# Patient Record
Sex: FEMALE | Race: WHITE | NOT HISPANIC OR LATINO | Employment: FULL TIME | URBAN - METROPOLITAN AREA
[De-identification: names, ages, dates, MRNs, and addresses within clinical notes are randomized per-mention and may not be internally consistent; named-entity substitution may affect disease eponyms.]

---

## 2018-01-11 ENCOUNTER — HOSPITAL ENCOUNTER (EMERGENCY)
Facility: HOSPITAL | Age: 58
Discharge: HOME | End: 2018-01-11
Attending: EMERGENCY MEDICINE | Admitting: EMERGENCY MEDICINE

## 2018-01-11 VITALS
HEART RATE: 68 BPM | TEMPERATURE: 98.1 F | WEIGHT: 174 LBS | RESPIRATION RATE: 20 BRPM | HEIGHT: 65 IN | BODY MASS INDEX: 28.99 KG/M2 | DIASTOLIC BLOOD PRESSURE: 85 MMHG | SYSTOLIC BLOOD PRESSURE: 140 MMHG | OXYGEN SATURATION: 98 %

## 2018-01-11 DIAGNOSIS — N76.4 ABSCESS OF RIGHT GENITAL LABIA: Primary | ICD-10-CM

## 2018-01-11 PROCEDURE — 99282 EMERGENCY DEPT VISIT SF MDM: CPT

## 2018-01-11 RX ORDER — AMOXICILLIN AND CLAVULANATE POTASSIUM 875; 125 MG/1; MG/1
1 TABLET, FILM COATED ORAL ONCE
Status: COMPLETED | OUTPATIENT
Start: 2018-01-11 | End: 2018-01-11

## 2018-01-11 RX ORDER — AMOXICILLIN AND CLAVULANATE POTASSIUM 875; 125 MG/1; MG/1
1 TABLET, FILM COATED ORAL 2 TIMES DAILY
Qty: 14 TABLET | Refills: 0 | Status: SHIPPED | OUTPATIENT
Start: 2018-01-11 | End: 2018-01-18

## 2018-01-11 RX ORDER — IBUPROFEN 600 MG/1
600 TABLET ORAL ONCE
Status: COMPLETED | OUTPATIENT
Start: 2018-01-11 | End: 2018-01-11

## 2018-01-11 RX ADMIN — IBUPROFEN 600 MG: 600 TABLET ORAL at 23:39

## 2018-01-11 RX ADMIN — AMOXICILLIN AND CLAVULANATE POTASSIUM 1 TABLET: 875; 125 TABLET, FILM COATED ORAL at 23:39

## 2018-01-12 NOTE — ED PROVIDER NOTES
History  Chief Complaint   Patient presents with    Abscess     Pt  presents to the ED with complaints of an abscess on her right labia rosalee has been present for 2 days  Pt  has attempted warm compresses but with no relief  49-year-old female presents emergency room for evaluation of right labial abscess  Onset 2 days ago  Today it will increased and she noted some drainage  She has been applying warm compresses with minimal relief  Pain is worse with walking  Denies fever nausea or vomiting  Denies history of this in the past         History provided by:  Patient  Abscess   Associated symptoms: no fever, no nausea and no vomiting        Prior to Admission Medications   Prescriptions Last Dose Informant Patient Reported? Taking? DULoxetine (CYMBALTA) 60 mg delayed release capsule 1/11/2018 at Unknown time  Yes Yes   Sig: Take 60 mg by mouth daily   Multiple Vitamins-Minerals (MULTIVITAMIN WITH MINERALS) tablet 1/11/2018 at Unknown time  Yes Yes   Sig: Take 1 tablet by mouth daily      Facility-Administered Medications: None       Past Medical History:   Diagnosis Date    Psychiatric disorder     depression       Past Surgical History:   Procedure Laterality Date    GASTRIC BYPASS      2004    HYSTERECTOMY         History reviewed  No pertinent family history  I have reviewed and agree with the history as documented  Social History   Substance Use Topics    Smoking status: Never Smoker    Smokeless tobacco: Never Used    Alcohol use No        Review of Systems   Constitutional: Negative for chills and fever  Gastrointestinal: Negative for abdominal pain, constipation, diarrhea, nausea and vomiting  Genitourinary: Negative for dysuria, vaginal bleeding and vaginal discharge  Skin: Positive for wound         Physical Exam  ED Triage Vitals [01/11/18 2229]   Temperature Pulse Respirations Blood Pressure SpO2   98 1 °F (36 7 °C) 68 20 140/85 98 %      Temp Source Heart Rate Source Patient Position - Orthostatic VS BP Location FiO2 (%)   Oral Monitor Sitting Left arm --      Pain Score       6           Orthostatic Vital Signs  Vitals:    01/11/18 2229   BP: 140/85   Pulse: 68   Patient Position - Orthostatic VS: Sitting       Physical Exam   Constitutional: She appears well-developed and well-nourished  Cardiovascular: Normal rate, regular rhythm and normal heart sounds  Genitourinary:   Genitourinary Comments: Right labia with local erythema and induration  There is a 0 75 cm open area with purulent phlegmon  during physical exam this area was probed with forceps and iris scissors  about 1 cm in depth no fluid collection underlying, there is no fluctuance upon palpation, it is very hard  Skin: Skin is warm and dry  Psychiatric: She has a normal mood and affect  Nursing note and vitals reviewed  ED Medications  Medications   amoxicillin-clavulanate (AUGMENTIN) 875-125 mg per tablet 1 tablet (not administered)   ibuprofen (MOTRIN) tablet 600 mg (not administered)       Diagnostic Studies  Results Reviewed     None                 No orders to display              Procedures  Procedures       Phone Contacts  ED Phone Contact    ED Course  ED Course                                MDM  Number of Diagnoses or Management Options  Abscess of right genital labia:   Risk of Complications, Morbidity, and/or Mortality  General comments: Had shared decision making conversation regarding conservative management with warm soaks and antibiotics versus opening abscess further  Patient opts for conservative management  I advised her to keep a close eye on it and return to emergency room if worse would like to have wound recheck in 2 days she understands and agrees to do so  Advised to wear loose clothing      Patient Progress  Patient progress: stable    CritCare Time    Disposition  Final diagnoses:   Abscess of right genital labia     Time reflects when diagnosis was documented in both MDM as applicable and the Disposition within this note     Time User Action Codes Description Comment    1/11/2018 11:34 PM Marti LOJA Add [N76 4] Abscess of right genital labia       ED Disposition     ED Disposition Condition Comment    Discharge  Mi Maldonado discharge to home/self care  Condition at discharge: Good        Follow-up Information     Follow up With Specialties Details Why Contact Info Additional Yuval Wolff MD Obstetrics and Gynecology In 3 days  [address] [phone]       [place] [address] Emergency Department Emergency Medicine In 2 days For wound re-check or sooner if worse [address] [address]  [phone] [facility] ED,  [address], [place], [address]        Patient's Medications   Discharge Prescriptions    AMOXICILLIN-CLAVULANATE (AUGMENTIN) 875-125 MG PER TABLET    Take 1 tablet by mouth 2 (two) times a day for 7 days       Start Date: 1/11/2018 End Date: 1/18/2018       Order Dose: 1 tablet       Quantity: 14 tablet    Refills: 0     No discharge procedures on file      ED Provider  Electronically Signed by           Kenneth Steve PA-C  01/11/18 0610

## 2018-01-12 NOTE — DISCHARGE INSTRUCTIONS
Abscess   WHAT YOU NEED TO KNOW:   A warm compress may help your abscess drain  Your healthcare provider may make a cut in the abscess so it can drain  You may need surgery to remove an abscess that is on your hands or buttocks  DISCHARGE INSTRUCTIONS:   Return to the emergency department if:   · The area around your abscess becomes very painful, warm, or has red streaks  · You have a fever and chills  · Your heart is beating faster than usual      · You feel faint or confused  Contact your healthcare provider if:   · Your abscess gets bigger or does not get better  · Your abscess returns  · You have questions or concerns about your condition or care  Medicines: You may  need any of the following:  · Antibiotics  help treat a bacterial infection  · Acetaminophen  decreases pain and fever  It is available without a doctor's order  Ask how much to take and how often to take it  Follow directions  Acetaminophen can cause liver damage if not taken correctly  · NSAIDs , such as ibuprofen, help decrease swelling, pain, and fever  This medicine is available with or without a doctor's order  NSAIDs can cause stomach bleeding or kidney problems in certain people  If you take blood thinner medicine, always ask your healthcare provider if NSAIDs are safe for you  Always read the medicine label and follow directions  · Take your medicine as directed  Contact your healthcare provider if you think your medicine is not helping or if you have side effects  Tell him or her if you are allergic to any medicine  Keep a list of the medicines, vitamins, and herbs you take  Include the amounts, and when and why you take them  Bring the list or the pill bottles to follow-up visits  Carry your medicine list with you in case of an emergency  Self-care:   · Apply a warm compress to your abscess  This will help it open and drain  Wet a washcloth in warm, but not hot, water  Apply the compress for 10 minutes  Repeat this 4 times each day  Do not  press on an abscess or try to open it with a needle  You may push the bacteria deeper or into your blood  · Do not share your clothes, towels, or sheets with anyone  This can spread the infection to others  · Wash your hands often  This can help prevent the spread of germs  Use soap and water or an alcohol-based hand rub  Care for your wound after it is drained:   · Care for your wound as directed  If your healthcare provider says it is okay, carefully remove the bandage and gauze packing  You may need to soak the gauze to get it out of your wound  Clean your wound and the area around it as directed  Dry the area and put on new, clean bandages  Change your bandages when they get wet or dirty  · Ask your healthcare provider how to change the gauze in your wound  Keep track of how many pieces of gauze are placed inside the wound  Do not put too much packing in the wound  Do not pack the gauze too tightly in your wound  Follow up with your healthcare provider in 1 to 3 days: You may need to have your packing removed or your bandage changed  Write down your questions so you remember to ask them during your visits  © 2017 [address]  Information is for End User's use only and may not be sold, redistributed or otherwise used for commercial purposes  All illustrations and images included in CareNotes® are the copyrighted property of [employer] , [employer]  or [employer]  The above information is an  only  It is not intended as medical advice for individual conditions or treatments  Talk to your doctor, nurse or pharmacist before following any medical regimen to see if it is safe and effective for you

## 2018-01-12 NOTE — ED NOTES
D/c meds/insturctions discussed with patient prior to d/c  Encouraged to f/u with pcp/ ob/gyn        Neil Gee RN  01/11/18 9823

## 2021-03-31 DIAGNOSIS — Z23 ENCOUNTER FOR IMMUNIZATION: ICD-10-CM

## 2023-04-23 ENCOUNTER — APPOINTMENT (EMERGENCY)
Dept: RADIOLOGY | Facility: HOSPITAL | Age: 63
End: 2023-04-23

## 2023-04-23 ENCOUNTER — HOSPITAL ENCOUNTER (INPATIENT)
Facility: HOSPITAL | Age: 63
LOS: 1 days | Discharge: HOME | End: 2023-04-23
Attending: SURGERY | Admitting: SURGERY

## 2023-04-23 ENCOUNTER — APPOINTMENT (EMERGENCY)
Dept: CT IMAGING | Facility: HOSPITAL | Age: 63
End: 2023-04-23

## 2023-04-23 VITALS
WEIGHT: 185.85 LBS | HEART RATE: 75 BPM | RESPIRATION RATE: 16 BRPM | HEIGHT: 65 IN | OXYGEN SATURATION: 99 % | TEMPERATURE: 98 F | SYSTOLIC BLOOD PRESSURE: 115 MMHG | BODY MASS INDEX: 30.96 KG/M2 | DIASTOLIC BLOOD PRESSURE: 73 MMHG

## 2023-04-23 DIAGNOSIS — S01.01XA SCALP LACERATION, INITIAL ENCOUNTER: ICD-10-CM
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
## 2023-04-23 DIAGNOSIS — Z98.890 HISTORY OF ANKLE SURGERY: ICD-10-CM

## 2023-04-23 PROBLEM — F10.11 HISTORY OF ALCOHOL ABUSE: Status: ACTIVE | Noted: 2023-04-23

## 2023-04-23 LAB
ABO GROUP BLD: NORMAL
ABO GROUP BLD: NORMAL
ANION GAP SERPL CALCULATED.3IONS-SCNC: 8 MMOL/L (ref 4–13)
BASE EXCESS BLDA CALC-SCNC: 0 MMOL/L (ref -2–3)
BASOPHILS # BLD AUTO: 0.05 THOUSANDS/ΜL (ref 0–0.1)
BASOPHILS NFR BLD AUTO: 1 % (ref 0–1)
BLD GP AB SCN SERPL QL: NEGATIVE
BUN SERPL-MCNC: 14 MG/DL (ref 5–25)
CA-I BLD-SCNC: 1.24 MMOL/L (ref 1.12–1.32)
CALCIUM SERPL-MCNC: 8.2 MG/DL (ref 8.4–10.2)
CHLORIDE SERPL-SCNC: 107 MMOL/L (ref 96–108)
CO2 SERPL-SCNC: 23 MMOL/L (ref 21–32)
CREAT SERPL-MCNC: 0.59 MG/DL (ref 0.6–1.3)
EOSINOPHIL # BLD AUTO: 0.38 THOUSAND/ΜL (ref 0–0.61)
EOSINOPHIL NFR BLD AUTO: 5 % (ref 0–6)
ERYTHROCYTE [DISTWIDTH] IN BLOOD BY AUTOMATED COUNT: 12.4 % (ref 11.6–15.1)
GFR SERPL CREATININE-BSD FRML MDRD: 98 ML/MIN/1.73SQ M
GLUCOSE SERPL-MCNC: 121 MG/DL (ref 65–140)
GLUCOSE SERPL-MCNC: 157 MG/DL (ref 65–140)
HCO3 BLDA-SCNC: 26.4 MMOL/L (ref 24–30)
HCT VFR BLD AUTO: 40.7 % (ref 34.8–46.1)
HCT VFR BLD CALC: 39 % (ref 34.8–46.1)
HGB BLD-MCNC: 13.1 G/DL (ref 11.5–15.4)
HGB BLDA-MCNC: 13.3 G/DL (ref 11.5–15.4)
IMM GRANULOCYTES # BLD AUTO: 0.03 THOUSAND/UL (ref 0–0.2)
IMM GRANULOCYTES NFR BLD AUTO: 0 % (ref 0–2)
LYMPHOCYTES # BLD AUTO: 1.74 THOUSANDS/ΜL (ref 0.6–4.47)
LYMPHOCYTES NFR BLD AUTO: 22 % (ref 14–44)
MCH RBC QN AUTO: 30.7 PG (ref 26.8–34.3)
MCHC RBC AUTO-ENTMCNC: 32.2 G/DL (ref 31.4–37.4)
MCV RBC AUTO: 95 FL (ref 82–98)
MONOCYTES # BLD AUTO: 0.56 THOUSAND/ΜL (ref 0.17–1.22)
MONOCYTES NFR BLD AUTO: 7 % (ref 4–12)
NEUTROPHILS # BLD AUTO: 5.26 THOUSANDS/ΜL (ref 1.85–7.62)
NEUTS SEG NFR BLD AUTO: 65 % (ref 43–75)
NRBC BLD AUTO-RTO: 0 /100 WBCS
PCO2 BLD: 28 MMOL/L (ref 21–32)
PCO2 BLD: 47.1 MM HG (ref 42–50)
PH BLD: 7.36 [PH] (ref 7.3–7.4)
PLATELET # BLD AUTO: 256 THOUSANDS/UL (ref 149–390)
PMV BLD AUTO: 10.3 FL (ref 8.9–12.7)
PO2 BLD: 27 MM HG (ref 35–45)
POTASSIUM BLD-SCNC: 3.8 MMOL/L (ref 3.5–5.3)
POTASSIUM SERPL-SCNC: 3.9 MMOL/L (ref 3.5–5.3)
RBC # BLD AUTO: 4.27 MILLION/UL (ref 3.81–5.12)
RH BLD: POSITIVE
RH BLD: POSITIVE
SAO2 % BLD FROM PO2: 47 % (ref 60–85)
SODIUM BLD-SCNC: 140 MMOL/L (ref 136–145)
SODIUM SERPL-SCNC: 138 MMOL/L (ref 135–147)
SPECIMEN EXPIRATION DATE: NORMAL
SPECIMEN SOURCE: ABNORMAL
WBC # BLD AUTO: 8.02 THOUSAND/UL (ref 4.31–10.16)

## 2023-04-23 PROCEDURE — 0HQ0XZZ REPAIR SCALP SKIN, EXTERNAL APPROACH: ICD-10-PCS | Performed by: SURGERY

## 2023-04-23 RX ORDER — ACETAMINOPHEN 325 MG/1
975 TABLET ORAL EVERY 8 HOURS SCHEDULED
Status: DISCONTINUED | OUTPATIENT
Start: 2023-04-23 | End: 2023-04-23 | Stop reason: HOSPADM

## 2023-04-23 RX ORDER — ONDANSETRON 2 MG/ML
4 INJECTION INTRAMUSCULAR; INTRAVENOUS EVERY 4 HOURS PRN
Status: DISCONTINUED | OUTPATIENT
Start: 2023-04-23 | End: 2023-04-23 | Stop reason: HOSPADM

## 2023-04-23 RX ORDER — METHOCARBAMOL 750 MG/1
750 TABLET, FILM COATED ORAL EVERY 6 HOURS SCHEDULED
Status: DISCONTINUED | OUTPATIENT
Start: 2023-04-23 | End: 2023-04-23

## 2023-04-23 RX ORDER — ACETAMINOPHEN 325 MG/1
975 TABLET ORAL EVERY 8 HOURS SCHEDULED
Qty: 30 TABLET | Refills: 0 | Status: SHIPPED | OUTPATIENT
Start: 2023-04-23

## 2023-04-23 RX ORDER — DESMOPRESSIN ACETATE 4 UG/ML
2 INJECTION, SOLUTION INTRAVENOUS; SUBCUTANEOUS ONCE
Status: DISCONTINUED | OUTPATIENT
Start: 2023-04-23 | End: 2023-04-23

## 2023-04-23 RX ORDER — DISULFIRAM 250 MG/1
250 TABLET ORAL DAILY
COMMUNITY

## 2023-04-23 RX ORDER — DULOXETIN HYDROCHLORIDE 60 MG/1
60 CAPSULE, DELAYED RELEASE ORAL DAILY
Status: DISCONTINUED | OUTPATIENT
Start: 2023-04-23 | End: 2023-04-23 | Stop reason: HOSPADM

## 2023-04-23 RX ORDER — DISULFIRAM 250 MG/1
250 TABLET ORAL DAILY
Status: DISCONTINUED | OUTPATIENT
Start: 2023-04-23 | End: 2023-04-23 | Stop reason: HOSPADM

## 2023-04-23 RX ADMIN — DULOXETINE HYDROCHLORIDE 60 MG: 60 CAPSULE, DELAYED RELEASE ORAL at 08:22

## 2023-04-23 RX ADMIN — ACETAMINOPHEN 975 MG: 325 TABLET ORAL at 05:08

## 2023-04-23 RX ADMIN — DESMOPRESSIN ACETATE 35.2 MCG: 4 INJECTION, SOLUTION INTRAVENOUS; SUBCUTANEOUS at 03:39

## 2023-04-23 NOTE — PHYSICAL THERAPY NOTE
"PHYSICAL THERAPY EVALUATION  NAME:  King Nelson  DATE: 04/23/23    AGE:   58 y o  Mrn:   [id number]  Principal problem: Principal Problem:    Scalp laceration, initial encounter  Active Problems:    Fall    History of ankle surgery    History of alcohol abuse      Vitals:    04/23/23 0315 04/23/23 0421 04/23/23 0800 04/23/23 0813   BP: 133/88 125/80  115/73   Pulse: 75 87  75   Resp: 18 18  16   Temp:  98 2 °F (36 8 °C)  98 °F (36 7 °C)   TempSrc:  Oral     SpO2: 97% 97% 99% 99%   Weight:  84 3 kg (185 lb 13 6 oz)     Height:  5' 5\" (1 651 m)         Length Of Stay: 0  Performed at least 2 patient identifiers during session: Name and Birthday  PHYSICAL THERAPY EVALUATION :    04/23/23 1132   PT Last Visit   PT Visit Date 04/23/23   Note Type   Note type Evaluation   Pain Assessment   Pain Assessment Tool 0-10   Pain Score No Pain  (@ rest, none offered during activity)   Restrictions/Precautions   RLE Weight Bearing Per Order (S)  NWB  (per recent admission @ [facility] after procedure)   Braces or Orthoses   (\"posterior splint\" although there is some noticable splint areas near medial>lateral side of splint that wrap to the front of anterior LE)   Other Precautions Bed Alarm; Chair Alarm;Cognitive; Fall Risk;Pain;WBS   Home Living   Type of Home Apartment   Home Layout Bed/bath upstairs; Performs ADLs on one level;Stairs to enter with rails  (1 BRITNEY from outside, then inside walkway, then flight of stes to her second floor set up)   [address] [place] [address],[address]; Wheelchair-manual   Additional Comments Since surgery 4/14/2023: pt has been using rolling walker vs WC in her second floor environment depending on space (WC will not fit in all areas)  She states performing flight of stairs on buttocks, and performing one BRITNEY using walker vs crutches forward with family presence  Pt states not feeling comfortable with crutches on stairs   Although knee scooter script was ordered after her surgery, pt/family states it was not " "\"cleared yet\" through [employer] comp   Prior Function   Level of [place] Independent with functional mobility; Independent with ADLs  (prior to fall in April)   Lives With Alone  (on her level but her sister lives on first floor apartment)   Receives Help From Family;Friend(s); Neighbor   IADLs Independent with meal prep; Independent with driving; Independent with medication management  (prior to fall)   Falls in the last 6 months 1 to 4  (one fall @ work, second fall prior to this admission when coming down stairs to [employer] the dogs out\")   Vocational Workers comp  (currently  Pt was  in [place] prior to fall )   General   Additional Pertinent History Pt recently had fall 4/7/2023, then ORIF R lat malleoli w/ syndesmosis repair on 4/14/2023 @ [facility]   Family/Caregiver Present Yes  (son and DIL)   Cognition   Overall Cognitive Status WFL   Arousal/Participation Alert   Orientation Level Oriented X4   Following Commands Follows one step commands with increased time or repetition  (needs intermittent redirection @ times, but easily redirectable )   Subjective   Subjective Pt pleasant and cooperative, stated that \"the staff was supporised with how I did walking\"  Denies any lightheadedness or spinning, including during change of positions   RUE Assessment   RUE Assessment WFL   LUE Assessment   LUE Assessment WFL   Strength RLE   R Hip Flexion   (tested to 3 w/ SLR)   R Knee Extension   (tested to 3 w/ SLR; exposed toes warm to touch, flex and extend)   Strength LLE   L Hip Flexion 4+/5   L Knee Extension 4+/5   L Ankle Dorsiflexion 4+/5   Coordination   Movements are Fluid and Coordinated 1   Light Touch   RLE Light Touch Grossly intact  (@ exposed toes)   LLE Light Touch Grossly intact   Bed Mobility   Supine to Sit 6  Modified independent   Additional items Assist x 1;HOB elevated; Bedrails; Increased time required;Verbal cues   Sit to Supine Unable to assess  (Pt OOB upon end of eval/treatment)   Transfers " "  Sit to Stand 5  Supervision   Additional items Assist x 1; Increased time required;Verbal cues  (Pt pulling up on walker)   Stand to Sit 5  Supervision   Additional items Increased time required;Verbal cues  (Pt does not reach back for traget surface)   Ambulation/Elevation   Gait pattern Excessively slow; Step to  (maintains NWB throughout gait cycle, but hopping upward as opposed to \"swing through\")   Gait Assistance 5  Supervision   Additional items Assist x 1;Verbal cues   Assistive Device Rolling walker   Distance 20'   Stair Management Assistance Not tested   Balance   Static Sitting Good   Static Standing Fair   Ambulatory Fair   Endurance Deficit   Endurance Deficit Yes   Endurance Deficit Description self reported  fatigue after \"hopping\"   Activity Tolerance   Activity Tolerance Patient limited by fatigue   Medical Staff Made Aware care coordination w/ Duy Brewer from trauma before and at end of session; spoke to Debbie Larkin from case management before session  Assessment:   Pt is a 58 y o  female seen for PT evaluation s/p admit to [facility] w/fall, Scalp laceration, initial encounter, recent ankle surgery approximately 1 week ago     Order placed for PT, however patient was nonweightbearing prior to this admission based on orthopedic orders from 4/14/2023  Della Cassidy Prior to fall on 4/7/2023: Patient was independent without DME, lived in a second-floor apartment with a flight of stairs to enter, and 1 stair from outside  Patient was working as a   Prior to this admission: Pt was most recently alternating between using a rolling walker and wheelchair under second floor set up, was going up and down a flight of stairs on her buttocks, and performed one-step to enter with either walker or crutches while maintaining nonweightbearing throughout  Upon evaluation: Pt required no physical assistance for bed mobility, transfers, nor gait within the room using a walker    However patient did " require verbal instruction for using proper technique for transfers and displayed gait deviations of hopping as opposed to swing through  Pt's clinical presentation is currently unstable/unpredictable given the functional mobility deficits above, especially (but not limited to) decreased functional mobility tolerance and orthopedic restrictions, coupled with fall risks including hx of falls, impaired balance and Obesity, and combined with medical complications of fear/retreat with use of crutches and Recent admission for ankle surgery          During this admission, pt would benefit from continued skilled inpatient PT in the acute care setting in order to address deficits as defined above to maximize function and mobility  Recommendations:    From a PT standpoint, recommend next several sessions focus on proper technique with transfers training, more efficient technique with gait training, education with patient and family regarding alternate techniques with performing steps to enter  Would also trial knee scooter to see if it is appropriate for patient for use upon discharge to decrease risk for falls  Nursing Recommendations:   Mobility Plan as of 04/23/23: Pt is one-person supervision/assist with RW to/from recliner, BSC and bathroom, patient is non weightbearing on RLE as per prior admission  Encourage OOB for all meals  Prognosis Fair   Problem List Decreased range of motion;Decreased endurance; Impaired balance;Decreased mobility; Impaired judgement;Obesity;Orthopedic restrictions;Pain  (gait deviations)   Barriers to Discharge Decreased caregiver support; Inaccessible home environment   Barriers to Discharge Comments Co-morbidities affecting pt's physical performance at time of assessment include: Psychiatric disorder, recent ORIF 4/14/2023 with posterior splint and nonweightbearing  History of EtOH, obesity,Gastric bypass     Personal factors affecting pt at time of IE include: steps to enter environment, limited home support, behavioral pattern, inability to perform current job functions, inability to perform IADLs, inability to perform ADLs, inability to ambulate household distances, inability to navigate community distances, limited insight into impairments (but desire to be as indep as possible) and recent fall(s)  Goals   Patient Goals to go home today   STG Expiration Date 05/03/23   Short Term Goal #1 Pt will: Perform bed mobility tasks with modified I to prepare for transfers and reposition in bed  Perform transfers with modified I to improve ease of transfers and With proper hand placement  Perform ambulation with rolling walker vs LRAD for up to 50 feet with supervision to improve gait quality and promote proper use of assistive device  Perform 1 step with walker versus crutches and w/No more than min assist to return to home with BRITNEY  PT Treatment Day 1   Plan   Treatment/Interventions Functional transfer training;LE strengthening/ROM; Bed mobility;Gait training;Equipment eval/education;Patient/family training;Cognitive reorientation; Endurance training;Spoke to nursing;Spoke to advanced practitioner;Spoke to case management   PT Frequency 3-5x/wk  (if not DCed)   Recommendation   PT Discharge Recommendation Home with home health rehabilitation   Equipment Recommended Laya Schreiber; Wheelchair   Additional Comments Pt may benefit from HHPT, especially to assist with troubleshooting her limited mobility (due to WB) in the home environment     AM-PAC Basic Mobility Inpatient   Turning in Flat Bed Without Bedrails 4   Lying on Back to Sitting on Edge of Flat Bed Without Bedrails 3   Moving Bed to Chair 3   Standing Up From Chair Using Arms 3   Walk in Room 3   Climb 3-5 Stairs With Railing 2   Basic Mobility Inpatient Raw Score 18   Basic Mobility Standardized Score 41 05   Highest Level Of Mobility   JH-HLM Goal 6: Walk 10 steps or more   JH-HLM Achieved 6: Walk 10 steps or more   Additional Treatment Session   Start Time 1146   End Time 5838   Treatment Assessment Patient and family verbalized good understanding of all training including but not limited to transfer training, gait with walker, stair training, options for performing stairs to enter building/her apartment, and other options to minimize times that patient will be going up and down the flight of the stairs  Betty Junior has walker and wheelchair for mobility on her 1 floor environment  At this time, would recommend patient continue to using the rolling walker as opposed to a knee scooter as she needs more training and physical assistance to perform mobility (using the knee scooter)  At this point, patient is adequate for discharge with the recommended supervision/assistance levels for elevations  Patient may benefit from continued PT in the home environment to troubleshoot functional mobility issues while she is home alone in a 1 floor set up  If patient is not discharged, recommend continued PT services to progress patient towards more independent level of function and with consistent/improved technique  Equipment Use shorter setting on rolling walker, knee scooter trial   Additional Treatment Day 1   Exercises   Neuro re-ed Additional time for patient education regarding transfer training with appropriate hand placement and technique for completing approach to sit  Patient continues to perform with no physical assistance required  Gait training with rolling walker for 15 feet after walker height adjustment and demonstration of swing through gait versus hopping gait to be more efficient while nonweightbearing in her home environment    Trials with knee scooter performed for 8 feet forward and 8' back with patient requiring more than 50% verbal instruction as well as steadying min assist   Patient declined need for physical performance for steps to enter the building and steps inside to her second floor set up, although patient family "education provided for alternative methods especially for performing one-step to enter the building  These are including but not limited to using rolling walker either backwards or forwards to perform ascend on 1 step to enter versus using a chair at the doorway the patient can sit on one close to the and spinning to continue mobility on the 1 level environment  Additional coordination between patient and family regarding other options for letting her dogs out instead of having her go up and down the stairs frequently  Patient and family verbalized good understanding of all training   End of Consult   Patient Position at End of Consult Bedside chair; All needs within reach   (Please find full objective findings from PT assessment regarding body systems outlined above)  The patient's [facility] Basic Mobility Inpatient Short Form Raw Score is 18  A Raw score of greater than 16 suggests the patient may benefit from discharge to home, which DOES coincide with CURRENT above PT recommendations with physical assistance/supervision is recommended especially for stairs        However please refer to therapist recommendation for discharge planning given other factors that may influence destination  Adapted from [employer] [facility] “6-Clicks” Basic Mobility and Daily Activity Scores With Discharge Destination  Physical Therapy, 2021;101:1-9  DOI: [id number]    Portions of the record may have been created with voice recognition software  Occasional wrong word or \"sound a like\" substitutions may have occurred due to the inherent limitations of voice recognition software    Read the chart carefully and recognize, using context, where substitutions have occurred    "

## 2023-04-23 NOTE — ED PROVIDER NOTES
Emergency Department Airway Evaluation and Management Form    History  Obtained from: Patient and EMS  Erythromycin  Chief Complaint   Patient presents with   • Trauma     HPI 60-year-old female presents for evaluation after falling down 5 steps  She had an unknown loss of consciousness  She states she lost her balance due to having a recent surgery on her right foot  She has been taking Percocet for pain  She was found by EMS to have a large hematoma to the left parietal scalp with a laceration  The patient takes aspirin  Past Medical History:   Diagnosis Date   • Psychiatric disorder     depression     Past Surgical History:   Procedure Laterality Date   • GASTRIC BYPASS      2004   • HYSTERECTOMY       No family history on file  Social History     Tobacco Use   • Smoking status: Never   • Smokeless tobacco: Never   Substance Use Topics   • Alcohol use: No   • Drug use: No     I have reviewed and agree with the history as documented  Review of Systems unable to obtain    Physical Exam  /85   Pulse 82   Temp 98 2 °F (36 8 °C) (Oral)   Resp 18   Wt 87 5 kg (192 lb 14 4 oz)   SpO2 97%   BMI 32 10 kg/m²     Physical Exam  Patient is awake alert, GCS is 14  Pupils are equal and reactive  Cervical collar in place  3 x 5 cm left parietal hematoma with bandage in place  Equal bilateral breath sounds  Airway intact  Patient moving all 4 extremities  There is a splint on the right lower extremity    ED Medications  Medications - No data to display    Intubation  Procedures    Notes  No acute airway intervention indicated, care relinquished to the trauma team    Final Diagnosis  Final diagnoses:   None       ED Provider  Electronically Signed by     Corrinne Loma, DO  04/23/23 0217

## 2023-04-23 NOTE — H&P
[facility]  H&P  Name: Jessica Trejo 58 y o  female I MRN: [id number]  Unit/Bed#: ED-01 I Date of Admission: 4/23/2023   Date of Service: 4/23/2023 I Hospital Day: 0      Assessment/Plan   Fall  Assessment & Plan  · Patient reports falling down 4-5 steps while at home this evening  · Recently had surgery on the right ankle for prior fall with fracture and felt unbalanced because of splint she has on that right ankle  · She reports unknown loss of consciousness but is having trouble remembering exactly what happened this evening  · She was persistently confused prompting ED physician to upgrade from trauma C to trauma B   · Injuries as below  · PT/OT eval  · Case management consult for disposition planning    Scalp laceration, initial encounter  Assessment & Plan  · Patient's head was wrapped by EMS prior to arrival  · Scalp hematoma found to be expanding  · Laceration repaired in the trauma bay by Dr Jeannette Blackwood with 4 sutures with appropriate hemostasis  · Pressure dressing applied  · Monitor wound  · DDAVP ordered for ASA reversal  · Suture removal in 7 days    History of alcohol abuse  Assessment & Plan  · Patient takes disulfiram daily  · Continue while inpatient    History of ankle surgery  Assessment & Plan  · Patient's status post ORIF of right ankle on 4/14  · Surgical dressing on right ankle  · Patient denies pain in the ankle currently           Trauma Alert: Level B   Model of Arrival: Ambulance    Trauma Team: Attending Jeannette Blackwood and Residents Madelin Izaguirre  Consultants:     None     History of Present Illness     Chief Complaint: Confusion and scalp laceration  Mechanism:Fall     HPI:    Jessica Trejo is a 58 y o  female with recent right ankle open reduction internal fixation for right ankle fracture after fall down the stairs who presents with large left parietal scalp laceration, expanding in size after falling down her stairs at home this evening    Patient has unknown loss of consciousness but cannot remember the event clearly  She denies pain currently and denies nausea, chest pain, shortness of breath, blurred vision  Other than confusion she feels in her normal state of health  Review of Systems   Constitutional: Negative for fatigue and fever  Respiratory: Negative for chest tightness and shortness of breath  Cardiovascular: Negative for chest pain and palpitations  Gastrointestinal: Negative for abdominal pain, nausea and vomiting  Musculoskeletal: Negative for neck pain and neck stiffness  Right ankle surgical boot/dressing   Skin: Positive for wound (Head)  Neurological: Negative for dizziness, light-headedness, numbness and headaches  12-point, complete review of systems was reviewed and negative except as stated above  Historical Information     Past Medical History:   Diagnosis Date   • Psychiatric disorder     depression     Past Surgical History:   Procedure Laterality Date   • GASTRIC BYPASS      2004   • HYSTERECTOMY          Social History     Tobacco Use   • Smoking status: Never   • Smokeless tobacco: Never   Substance Use Topics   • Alcohol use: No   • Drug use: No     Immunization History   Administered Date(s) Administered   • COVID-19 MODERNA VACC 0 5 ML IM 04/13/2021, 05/11/2021, 12/17/2021     Last Tetanus: October 2016  Family History: Non-contributory     Meds/Allergies   all current active meds have been reviewed   Current Meds:  Medication Sig   DULoxetine (CYMBALTA) 60 MG capsule   Take 1 capsule (60 mg total) by mouth daily  MULTIVITAMIN ORAL   Take by mouth  disulfiram (ANTABUSE) 250 mg tablet   Take 1 tablet (250 mg total) by mouth daily  ALPRAZolam (XANAX) 0 25 MG tablet   Take 1 tablet (0 25 mg total) by mouth 3 (three) times a day  gentamicin (GARAMYCIN) 0 3 % ophthalmic solution   Apply 1 drop to eye every 6 (six) hours     zolpidem (AMBIEN) 10 mg tablet   take 1 tablet by mouth at bedtime if needed oxyCODONE-acetaminophen (PERCOCET) 5-325 mg per tablet   Take 1 tablet by mouth every 4 (four) hours as needed for moderate pain (pain score 4-6)  Max Daily Amount: 6 tablets   aspirin 81 MG EC tablet   Take 1 tablet (81 mg total) by mouth 2 (two) times a day  Allergies have not been reviewed; Allergies   Allergen Reactions   • Erythromycin Hives       Objective   Initial Vitals:   Temperature: 98 2 °F (36 8 °C) (04/23/23 0145)  Pulse: 91 (04/23/23 0145)  Respirations: 18 (04/23/23 0145)  Blood Pressure: 157/92 (04/23/23 0145)    Primary Survey:   Airway:        Status: patent;        Pre-hospital Interventions: none        Hospital Interventions: none  Breathing:        Pre-hospital Interventions: none       Effort: normal       Right breath sounds: normal       Left breath sounds: normal  Circulation:        Rhythm: regular       Rate: regular   Right Pulses Left Pulses    R radial: 2+  R femoral: 2+  R pedal: 2+     L radial: 2+  L femoral: 2+  L pedal: 2+       Disability:        GCS: Eye: 4; Verbal: 4 Motor: 6 Total: 14       Right Pupil: round;  reactive         Left Pupil:  round;  reactive      R Motor Strength L Motor Strength    R : 5/5  R dorsiflex: 5/5  R plantarflex: 5/5 L : 5/5  L dorsiflex: 5/5  L plantarflex: 5/5        Sensory:  No sensory deficit  Exposure:       Completed: Yes      Secondary Survey:  Physical Exam  Vitals reviewed  Constitutional:       General: She is not in acute distress  Appearance: Normal appearance  She is not ill-appearing  HENT:      Head: Normocephalic  Comments: Large left parietal laceration with expanding hematoma     Right Ear: External ear normal       Left Ear: External ear normal       Nose: Nose normal       Mouth/Throat:      Mouth: Mucous membranes are moist       Pharynx: Oropharynx is clear  Eyes:      Extraocular Movements: Extraocular movements intact        Conjunctiva/sclera: Conjunctivae normal       Pupils: Pupils are equal, round, and reactive to light  Neck:      Comments: Cervical collar in place  Cardiovascular:      Rate and Rhythm: Normal rate and regular rhythm  Pulses: Normal pulses  Heart sounds: No murmur heard  Pulmonary:      Effort: Pulmonary effort is normal       Breath sounds: Normal breath sounds  No wheezing, rhonchi or rales  Abdominal:      General: Abdomen is flat  Palpations: Abdomen is soft  There is no mass  Tenderness: There is no abdominal tenderness  There is no guarding  Musculoskeletal:         General: No swelling or tenderness  Normal range of motion  Cervical back: No tenderness  Left lower leg: No edema  Comments: Right lower surgical boot in place without surrounding tenderness or erythema   Skin:     General: Skin is warm and dry  Capillary Refill: Capillary refill takes less than 2 seconds  Neurological:      General: No focal deficit present  Mental Status: She is alert  She is disoriented  Cranial Nerves: No cranial nerve deficit  Sensory: No sensory deficit  Motor: No weakness        Comments: Disoriented to location         Invasive Devices     Peripheral Intravenous Line  Duration           Peripheral IV 04/23/23 Left Antecubital <1 day              Lab Results: Results: I have personally reviewed all pertinent laboratory/tests results  Results Reviewed     Procedure Component Value Units Date/Time    POCT Blood Gas (CG8+) [[id number]]  (Abnormal) Collected: 04/23/23 0158    Lab Status: Final result Specimen: Venous Updated: 04/23/23 0201     ph, Finesse ISTAT 7 357     pCO2, Finesse i-STAT 47 1 mm HG      pO2, Finesse i-STAT 27 0 mm HG      BE, i-STAT 0 mmol/L      HCO3, Finesse i-STAT 26 4 mmol/L      CO2, i-STAT 28 mmol/L      O2 Sat, i-STAT 47 %      SODIUM, I-STAT 140 mmol/l      Potassium, i-STAT 3 8 mmol/L      Calcium, Ionized i-STAT 1 24 mmol/L      Hct, i-STAT 39 %      Hgb, i-STAT 13 3 g/dl      Glucose, i-STAT 157 mg/dl Specimen Type VENOUS        Imaging Results: I have personally reviewed pertinent reports  Chest Xray(s): negative for acute findings   FAST exam(s): negative for acute findings   CT Scan(s): negative for acute findings   Additional Xray(s): N/A     Other Studies: n/a    Code Status: No Order  Advance Directive and Living Will:      Power of :    POLST:    I have spent 45 minutes with Patient  today in which greater than 50% of this time was spent in counseling/coordination of care regarding Diagnostic results, Patient and family education, Impressions, Counseling / Coordination of care, Documenting in the medical record, Reviewing / ordering tests, medicine, procedures  , Obtaining or reviewing history   and Communicating with other healthcare professionals

## 2023-04-23 NOTE — ASSESSMENT & PLAN NOTE
· Patient's status post ORIF of right ankle on 4/14  · Surgical dressing on right ankle  · Patient denies pain in the ankle currently

## 2023-04-23 NOTE — PLAN OF CARE
Problem: PHYSICAL THERAPY ADULT  Goal: Performs mobility at highest level of function for planned discharge setting  See evaluation for individualized goals  Description: Treatment/Interventions: Functional transfer training, LE strengthening/ROM, Bed mobility, Gait training, Equipment eval/education, Patient/family training, Cognitive reorientation, Endurance training, Spoke to nursing, Spoke to advanced practitioner, Spoke to case management  Equipment Recommended: Beacher Hose, Wheelchair       See flowsheet documentation for full assessment, interventions and recommendations  Note: Prognosis: Fair  Problem List: Decreased range of motion, Decreased endurance, Impaired balance, Decreased mobility, Impaired judgement, Obesity, Orthopedic restrictions, Pain (gait deviations)  Assessment: Pt is a 58 y o  female seen for PT evaluation s/p admit to [facility] on 4/23/2023 w/fall, Scalp laceration, initial encounter, recent ankle surgery approximately 1 week ago     Order placed for PT, however patient was nonweightbearing prior to this admission based on orthopedic orders from 4/14/2023  Candida Hy Prior to fall on 4/7/2023: Patient was independent without DME, lived in a second-floor apartment with a flight of stairs to enter, and 1 stair from outside  Patient was working as a   Prior to this admission: Pt was most recently alternating between using a rolling walker and wheelchair under second floor set up, was going up and down a flight of stairs on her buttocks, and performed one-step to enter with either walker or crutches while maintaining nonweightbearing throughout  Upon evaluation: Pt required no physical assistance for bed mobility, transfers, nor gait within the room using a walker  However patient did require verbal instruction for using proper technique for transfers and displayed gait deviations of hopping as opposed to swing through        Pt's clinical presentation is currently unstable/unpredictable given the functional mobility deficits above, especially (but not limited to) decreased functional mobility tolerance and orthopedic restrictions, coupled with fall risks including hx of falls, impaired balance and Obesity, and combined with medical complications of fear/retreat with use of crutches and Recent admission for ankle surgery          During this admission, pt would benefit from continued skilled inpatient PT in the acute care setting in order to address deficits as defined above to maximize function and mobility  Recommendations:    From a PT standpoint, recommend next several sessions focus on proper technique with transfers training, more efficient technique with gait training, education with patient and family regarding alternate techniques with performing steps to enter  Would also trial knee scooter to see if it is appropriate for patient for use upon discharge to decrease risk for falls  Nursing Recommendations:   Mobility Plan as of 04/23/23: Pt is one-person supervision/assist with RW to/from recliner, BSC and bathroom, patient is non weightbearing on RLE as per prior admission  Encourage OOB for all meals  Barriers to Discharge: Decreased caregiver support, Inaccessible home environment  Barriers to Discharge Comments: Co-morbidities affecting pt's physical performance at time of assessment include: Psychiatric disorder, recent ORIF 4/14/2023 with posterior splint and nonweightbearing  History of EtOH, obesity,Gastric bypass    Personal factors affecting pt at time of IE include: steps to enter environment, limited home support, behavioral pattern, inability to perform current job functions, inability to perform IADLs, inability to perform ADLs, inability to ambulate household distances, inability to navigate community distances, limited insight into impairments (but desire to be as indep as possible) and recent fall(s)    PT Discharge Recommendation: Home with home health rehabilitation    See flowsheet documentation for full assessment

## 2023-04-23 NOTE — ASSESSMENT & PLAN NOTE
· Patient reports falling down 4-5 steps while at home this evening  · Recently had surgery on the right ankle for prior fall with fracture and felt unbalanced because of splint she has on that right ankle  · She reports unknown loss of consciousness but is having trouble remembering exactly what happened this evening    · She was persistently confused prompting ED physician to upgrade from trauma C to trauma B   · Injuries as below  · PT/OT eval  · Case management consult for disposition planning

## 2023-04-23 NOTE — PLAN OF CARE
Problem: PHYSICAL THERAPY ADULT  Goal: Performs mobility at highest level of function for planned discharge setting  See evaluation for individualized goals  Description: Treatment/Interventions: Functional transfer training, LE strengthening/ROM, Bed mobility, Gait training, Equipment eval/education, Patient/family training, Cognitive reorientation, Endurance training, Spoke to nursing, Spoke to advanced practitioner, Spoke to case management  Equipment Recommended: Milagro Cano, Wheelchair       See flowsheet documentation for full assessment, interventions and recommendations  4/23/2023 1305 by Yuridia Reynoso PT  Outcome: Adequate for Discharge  Note: Prognosis: Fair  Problem List: Decreased range of motion, Decreased endurance, Impaired balance, Decreased mobility, Impaired judgement, Obesity, Orthopedic restrictions, Pain (gait deviations)  Assessment:Patient and family verbalized good understanding of all training including but not limited to transfer training, gait with walker, stair training, options for performing stairs to enter building/her apartment, and other options to minimize times that patient will be going up and down the flight of the stairs  Juan Matos has walker and wheelchair for mobility on her 1 floor environment  At this time, would recommend patient continue to using the rolling walker as opposed to a knee scooter as she needs more training and physical assistance to perform mobility (using the knee scooter)  At this point, patient is adequate for discharge with the recommended supervision/assistance levels for elevations  Patient may benefit from continued PT in the home environment to troubleshoot functional mobility issues while she is home alone in a 1 floor set up  If patient is not discharged, recommend continued PT services to progress patient towards more independent level of function and with consistent/improved technique      Barriers to Discharge: Decreased caregiver support, Inaccessible home environment  Barriers to Discharge Comments: Co-morbidities affecting pt's physical performance at time of assessment include: Psychiatric disorder, recent ORIF 4/14/2023 with posterior splint and nonweightbearing  History of EtOH, obesity,Gastric bypass    Personal factors affecting pt at time of IE include: steps to enter environment, limited home support, behavioral pattern, inability to perform current job functions, inability to perform IADLs, inability to perform ADLs, inability to ambulate household distances, inability to navigate community distances, limited insight into impairments (but desire to be as indep as possible) and recent fall(s)  PT Discharge Recommendation: Home with home health rehabilitation    See flowsheet documentation for full assessment

## 2023-04-23 NOTE — PROGRESS NOTES
[facility]  Progress Note  Name: Ferdinand Uribe I  MRN: [id number]  Unit/Bed#: W -01 I Date of Admission: 4/23/2023   Date of Service: 4/23/2023 I Hospital Day: 0    Assessment/Plan   History of alcohol abuse  Assessment & Plan  · Patient takes disulfiram daily  · Continue while inpatient    History of ankle surgery  Assessment & Plan  · Patient's status post ORIF of right ankle on 4/14  · Surgical dressing on right ankle  · Patient denies pain in the ankle currently      Scalp laceration, initial encounter  Assessment & Plan  · Patient's head was wrapped by EMS prior to arrival  · Scalp hematoma found to be expanding  · Laceration repaired in the trauma bay by Dr Lianne Key with 4 sutures with appropriate hemostasis  · Pressure dressing applied  · Monitor wound  · DDAVP ordered for ASA reversal  · Suture removal in 7 days  · Headache resolved  · Hemoglobin 13 1    Fall  Assessment & Plan  · Patient reports falling down 4-5 steps while at home this evening  · Recently had surgery on the right ankle for prior fall with fracture and felt unbalanced because of splint she has on that right ankle  · She reports unknown loss of consciousness but is having trouble remembering exactly what happened this evening  · She was persistently confused prompting ED physician to upgrade from trauma C to trauma B   · Injuries as below  · PT/OT eval  · Case management consult for disposition planning             TRAUMA TERTIARY SURVEY NOTE    VTE Prophylaxis:Sequential compression device (Venodyne)  and Enoxaparin (Lovenox)     Disposition: home    Code status:  Level 3 - DNAR and DNI    Consultants: None    Subjective   Transfer from: home    Mechanism of Injury:Fall     Chief Complaint: headache which has since resolved    HPI/Last 24 hour events: Admitted to Trauma, scalp laceration closed by attending       Objective   Vitals:   Temp:  [98 2 °F (36 8 °C)] 98 2 °F (36 8 °C)  HR:  [75-91] 87  Resp:  [18] 18  BP: (123-157)/(80-92) 125/80    I/O     None           Physical Exam:   GENERAL APPEARANCE: comfortable  NEURO: GCS - 15 and intact  HEENT: EOm's intact  CV: RRR< no complaints of chest pain  LUNGS: CTA bilaterally, no shortness of breath  GI: Abdomen soft with bowel sounds present  : voiding  MSK: moving all four extremities  SKIN: warm and dry    Invasive Devices     Peripheral Intravenous Line  Duration           Peripheral IV 04/23/23 Left Antecubital <1 day                        Lab Results:    Latest Reference Range & Units 04/23/23 04:43   Sodium 135 - 147 mmol/L 138   Potassium 3 5 - 5 3 mmol/L 3 9   Chloride 96 - 108 mmol/L 107   CO2 21 - 32 mmol/L 23   Anion Gap 4 - 13 mmol/L 8   BUN 5 - 25 mg/dL 14   Creatinine 0 60 - 1 30 mg/dL 0 59 (L)   Glucose, Random 65 - 140 mg/dL 121   Calcium 8 4 - 10 2 mg/dL 8 2 (L)   eGFR ml/min/1 73sq m 98   WBC 4 31 - 10 16 Thousand/uL 8 02   Red Blood Cell Count 3 81 - 5 12 Million/uL 4 27   Hemoglobin 11 5 - 15 4 g/dL 13 1   HCT 34 8 - 46 1 % 40 7   MCV 82 - 98 fL 95   MCH 26 8 - 34 3 pg 30 7   MCHC 31 4 - 37 4 g/dL 32 2   RDW 11 6 - 15 1 % 12 4   Platelet Count 950 - 390 Thousands/uL 256   MPV 8 9 - 12 7 fL 10 3   nRBC /100 WBCs 0   (L): Data is abnormally low    Imaging Results: I have personally reviewed pertinent reports      Chest Xray(s): No acute cardiopulmonary disease within limitations of supine imaging      Findings are stable   FAST exam(s): N/A   CT Scan(s): HCT - No intracranial hemorrhage or calvarial fracture       Additional Xray(s): CT C-spine -No cervical spine fracture or traumatic malalignment      Other Studies: none

## 2023-04-23 NOTE — PROCEDURES
POC FAST US    Date/Time: 4/23/2023 3:07 AM  Performed by: Tawanda Swenson DO  Authorized by:  Tawanda Swenson DO     Patient location:  Trauma  Procedure details:     Exam Type:  Diagnostic    Indications: blunt abdominal trauma      Assess for:  Intra-abdominal fluid and pericardial effusion    Technique: FAST      Views obtained:  Heart - Pericardial sac, RUQ - Joel's Pouch, LUQ - Splenorenal space and Suprapubic - Pouch of Nathaniel    Image quality: diagnostic      Image availability:  Images available in PACS  FAST Findings:     RUQ (Hepatorenal) free fluid: absent      LUQ (Splenorenal) free fluid: absent      Suprapubic free fluid: absent      Cardiac wall motion: identified      Pericardial effusion: absent    Interpretation:     Impressions: negative

## 2023-04-23 NOTE — ASSESSMENT & PLAN NOTE
· Patient's head was wrapped by EMS prior to arrival  · Scalp hematoma found to be expanding  · Laceration repaired in the trauma bay by Dr Aminata Calvert with 4 sutures with appropriate hemostasis  · Pressure dressing applied  · Monitor wound  · DDAVP ordered for ASA reversal  · Suture removal in 7 days

## 2023-04-23 NOTE — CASE MANAGEMENT
Case Management Discharge Planning Note    Patient name Kvng Sequeira  Location W /W -61 MRN [id number]  : 1960 Date 2023       Current Admission Date: 2023  Current Admission Diagnosis:Scalp laceration, initial encounter   Patient Active Problem List    Diagnosis Date Noted   • Fall 2023   • Scalp laceration, initial encounter 2023   • History of ankle surgery 2023   • History of alcohol abuse 2023      LOS (days): 0  Geometric Mean LOS (GMLOS) (days):   Days to GMLOS:     OBJECTIVE:  Risk of Unplanned Readmission Score: 6 66         Current admission status: Inpatient   Preferred Pharmacy:   [address] [place] [address] [address]  Phone: [phone] Fax: [phone]    Primary Care Provider: Smita Mendoza MD    Primary Insurance:   Secondary Insurance:     DISCHARGE DETAILS:  CM spoke with patient on the phone  CM introduced self and role  Patient confirmed she does not have health insurance  Patient updated CM will send referral to financial counselors to follow up with her regarding insurance assistance  CM discussed with patient per PT, the recommendation for Home PT services  Patient aware that without insurance, she would be responsible for out of pocket cost  Patient declined stating she does not have the money to pay out of pocket for VNA services  CM will reach out to [facility] to see if they would be able to assist      Patient provided contact number [phone]  CM sent e-mail to financial counselors re: no insurance  CM sent referral via [facility]idin to [facility] for Home PT  Waiting for response  Trauma updated

## 2023-04-23 NOTE — CASE MANAGEMENT
Case Management Discharge Planning Note    Patient name [facility]  Location W /W -35 MRN [id number]  : 1960 Date 2023       Current Admission Date: 2023  Current Admission Diagnosis:Scalp laceration, initial encounter   Patient Active Problem List    Diagnosis Date Noted   • Fall 2023   • Scalp laceration, initial encounter 2023   • History of ankle surgery 2023   • History of alcohol abuse 2023      LOS (days): 1  Geometric Mean LOS (GMLOS) (days):   Days to GMLOS:     OBJECTIVE:  Risk of Unplanned Readmission Score: 6 66         Current admission status: Inpatient   Preferred Pharmacy:   [address] [place] [address] [address] [place]  Phone: [phone] Fax: [phone]    Primary Care Provider: Rosalia Mortensen MD    Primary Insurance:   Secondary Insurance:     DISCHARGE DETAILS:  VNA unable to accept patient without insurance pending  Concern patient has been following outpatient with [facility] services

## 2023-04-23 NOTE — DISCHARGE INSTR - AVS FIRST PAGE
May use soap and water t or shampoo clean hair  Please be gentle around the laceration  Pat hair dry  If any additional bleeding go to ER    Follow previous instructions from Orthopedics regarding the ankle

## 2023-04-23 NOTE — TRAUMA DOCUMENTATION
Patients son states that he does not want his mother being discharged with narcotics because she has a history of substance abuse

## 2023-04-23 NOTE — QUICK NOTE
Cervical Collar Clearance: The patient had a CT scan of the cervical spine demonstrating no acute injury  On exam, the patient had no midline point tenderness or paresthesias/numbness/weakness in the extremities  The patient had full range of motion (was then able to flex, extend, and rotate head laterally) without pain  There were no distracting injuries and the patient was not intoxicated  The patient's cervical spine was cleared radiologically and clinically  Cervical collar removed at this time       Laith Dunn DO  4/23/2023 3:51 AM

## 2023-04-23 NOTE — ASSESSMENT & PLAN NOTE
· Patient's head was wrapped by EMS prior to arrival  · Scalp hematoma found to be expanding  · Laceration repaired in the trauma bay by Dr Juliocesar Muhammad with 4 sutures with appropriate hemostasis  · Pressure dressing applied  · Monitor wound  · DDAVP ordered for ASA reversal  · Suture removal in 7 days  · Headache resolved  · Hemoglobin 13 1

## 2023-04-23 NOTE — DISCHARGE SUMMARY
"  Discharge Summary - Micheal Corral 58 y o  female MRN: [id number]    Unit/Bed#: W -54 Encounter: [id number]    Admission Date:   Admission Orders (From admission, onward)     Ordered        04/23/23 0329  Inpatient Admission  Once                        Admitting Diagnosis: Multiple injuries [T07  XXXA]  Fall, initial encounter Aruna Richards  XXXA]  Scalp laceration, initial encounter [S01 01XA]    HPI: per resident A  Sandoval:  \"Esther Maldonado is a 58 y o  female with recent right ankle open reduction internal fixation for right ankle fracture after fall down the stairs who presents with large left parietal scalp laceration, expanding in size after falling down her stairs at home this evening  Patient has unknown loss of consciousness but cannot remember the event clearly  She denies pain currently and denies nausea, chest pain, shortness of breath, blurred vision  Other than confusion she feels in her normal state of health  \"    Procedures Performed:   Orders Placed This Encounter   Procedures   • Fast Ultrasound   • Laceration repair       Summary of Hospital Course: 59 yo female s/p fall resulting in complex scalp laceration  Repaired by Dr Georgette Maciel, sutures intact, dressing dry  Discussed care of laceration and follow up with Trauma in 2 weeks  No headache, only wishes for Tylenol if she should have pain  Family in room and verify understanding of discharge plan as well as patient  Will also follow up with PCP and Orthopedics  Significant Findings, Care, Treatment and Services Provided: CT head without contrast    Result Date: 4/23/2023  Impression: No intracranial hemorrhage or calvarial fracture  Findings discussed with Dr Georgette Maciel at 245 am, 4/23/23 Workstation performed: [id number]     CT spine cervical without contrast    Result Date: 4/23/2023  Impression: No cervical spine fracture or traumatic malalignment   Findings discussed with Dr Georgette Maciel at 245 am, 4/23/23  Workstation " performed: [id number]     XR trauma multiple    Result Date: 4/23/2023  Impression: No acute cardiopulmonary disease within limitations of supine imaging  Findings are stable Workstation performed: [id number]         Complications: none    Discharge Diagnosis: S/P Fall  Scalp laceration  Previous Left ankle repair with ORIF on 4/14/23    Medical Problems     Resolved Problems  Date Reviewed: 4/23/2023   None         Condition at Discharge: stable         Discharge instructions/Information to patient and family:   See after visit summary for information provided to patient and family  Provisions for Follow-Up Care:  See after visit summary for information related to follow-up care and any pertinent home health orders  PCP: Katy Munson MD    Disposition: Home    Planned Readmission: No      Discharge Statement   I spent 30 minutes discharging the patient  This time was spent on the day of discharge  I had direct contact with the patient on the day of discharge  Additional documentation is required if more than 30 minutes were spent on discharge  Discharge Medications:  See after visit summary for reconciled discharge medications provided to patient and family

## 2023-04-23 NOTE — PROCEDURES
Laceration repair    Date/Time: 4/23/2023 3:07 AM  Performed by: Jessica Pedroza DO  Authorized by: Jessica Pedroza DO   Consent: The procedure was performed in an emergent situation  Patient identity confirmed: verbally with patient  Body area: head/neck  Location details: scalp  Laceration length: 5 cm  Foreign bodies: no foreign bodies  Tendon involvement: none  Nerve involvement: none  Vascular damage: no    Wound Dehiscence:  Superficial Wound Dehiscence: simple closure      Procedure Details:  Preparation: Patient was prepped and draped in the usual sterile fashion  Irrigation solution: iodine and hydrogen peroxide  Amount of cleaning: standard  Wound skin closure material used: 2-0 prolene    Number of sutures: 4  Technique: simple  Approximation: close  Approximation difficulty: simple  Dressing: 4x4 sterile gauze and pressure dressing  Patient tolerance: patient tolerated the procedure well with no immediate complications

## 2023-04-24 NOTE — UTILIZATION REVIEW
Initial Clinical Review    Admission: Date/Time/Statement:   Admission Orders (From admission, onward)     Ordered        04/23/23 0329  Inpatient Admission  Once                      Orders Placed This Encounter   Procedures   • Inpatient Admission     Standing Status:   Standing     Number of Occurrences:   1     Order Specific Question:   Level of Care     Answer:   Med Surg [16]     Order Specific Question:   Estimated length of stay     Answer:   More than 2 Midnights     Order Specific Question:   Certification     Answer:   I certify that inpatient services are medically necessary for this patient for a duration of greater than two midnights  See H&P and MD Progress Notes for additional information about the patient's course of treatment  ED Arrival Information     Expected   -    Arrival   4/23/2023 01:39    Acuity   Emergent            Means of arrival   Ambulance    [facility]    Admission type   Emergency            Arrival complaint   FALL           Chief Complaint   Patient presents with   • Trauma       Initial Presentation: 58 y o  female  with hx alcohol abuse on disulfiram, s/p  ORIF R ankle 4/14/23 s/p fall down steps, on ASA who presents to ED with large  left parietal scalp laceration, expanding in size after falling down 4-5 stairs at home this evening  Pt can't remember event clearly, unsure if LOC  Pt feels confused  Does report feeling unbalanced ambulating 2/2 R ankle splintOn exam, R ankle surgical boot /drsg in place , GCS 14, L sided parietal laceration w/ expanding hematoma noted   Pt disoriented to location  , no focal neuro deficit  Imaging shows no acute traumatic injury  Pt given DDAVP in ED  Pt admitted as Inpatient as trauma B  with Left scalp hematoma noted with overlying laceration with active hemorrhage - controlled with suture of scalp laceration with 4 sutures  PLan - Serial H/H, pressure drsg to scalp, am exam to monitor wound  Kerry Sneed  PT/OT stephy     PT -recommendsHome with home health rehabilitation   Update- trauma- pt d/c'ed to home, no HA, GCS 15   Only wishes for Tylenol if she should have pain  F/U with PCP and Orthopedics as outpt        ED Triage Vitals   Temperature Pulse Respirations Blood Pressure SpO2   04/23/23 0145 04/23/23 0145 04/23/23 0145 04/23/23 0145 04/23/23 0145   98 2 °F (36 8 °C) 91 18 157/92 98 %      Temp Source Heart Rate Source Patient Position - Orthostatic VS BP Location FiO2 (%)   04/23/23 0145 04/23/23 0145 04/23/23 0150 -- --   Oral Monitor Lying        Pain Score       04/23/23 0421       4          Wt Readings from Last 1 Encounters:   04/23/23 84 3 kg (185 lb 13 6 oz)     Additional Vital Signs:   Patient Position - Orthostatic VS         04/23/23 08:13:28 98 °F (36 7 °C) 75 16 115/73 87 99 %   04/23/23 0800 -- -- -- -- -- 99 %   04/23/23 04:21:11 98 2 °F (36 8 °C) 87 18 125/80 95 97 %   04/23/23 0315 -- 75 18 133/88 -- 97 %   04/23/23 0245 -- 75 18 125/84 -- 96 %   04/23/23 0230 -- 78 18 127/85 -- 97 %   04/23/23 02:15:42 -- 82 18 127/83 -- 98 %   04/23/23 0200 -- 82 18 123/85 -- 97 %   04/23/23 0150 -- 84 18 157/90 -- 98 %     Date and Time R Radial Pulse L Radial Pulse R Pedal Pulse L Pedal Pulse   04/23/23 0800 +2 +2 +2 --   04/23/23 0144 +2 +2 +2 +2   Trauma Secondary Assessment - Surekha Coma Scale    Date and Time Eye Opening Best Verbal Response Best Motor Response Surekha Coma Scale Score   04/23/23 0800 4 5 6 15   04/23/23 0500 4 5 6 15   04/23/23 0315 4 4 6 14   04/23/23 0245 4 4 6 14   04/23/23 0230 4 4 6 14   04/23/23 0215 4 4 6 14   04/23/23 0200 4 4 6 14   04/23/23 0150 4 4 6 14   04/23/23 0145 4 4 6 14               Pertinent Labs/Diagnostic Test Results:   CT head without contrast   Final Result by Yvan Wagner MD (04/23 0245)      No intracranial hemorrhage or calvarial fracture        Findings discussed with Dr Jeannette Blackwood at 245 am, 4/23/23            Workstation performed: [id number]         CT spine cervical without contrast   Final Result by Yael Cruz MD (04/23 8145)      No cervical spine fracture or traumatic malalignment  Findings discussed with Dr Noel Sheriff at 245 am, 4/23/23                   Workstation performed: [id number]         XR trauma multiple   Final Result by Josh Wheeler MD (04/23 0915)      No acute cardiopulmonary disease within limitations of supine imaging        Findings are stable         Workstation performed: [id number]         XR chest 1 view    (Results Pending)         Results from last 7 days   Lab Units 04/23/23  0443 04/23/23  0158   WBC Thousand/uL 8 02  --    HEMOGLOBIN g/dL 13 1  --    I STAT HEMOGLOBIN g/dl  --  13 3   HEMATOCRIT % 40 7  --    HEMATOCRIT, ISTAT %  --  39   PLATELETS Thousands/uL 256  --    NEUTROS ABS Thousands/µL 5 26  --          Results from last 7 days   Lab Units 04/23/23  0443 04/23/23  0158   SODIUM mmol/L 138  --    POTASSIUM mmol/L 3 9  --    CHLORIDE mmol/L 107  --    CO2 mmol/L 23  --    CO2, I-STAT mmol/L  --  28   ANION GAP mmol/L 8  --    BUN mg/dL 14  --    CREATININE mg/dL 0 59*  --    EGFR ml/min/1 73sq m 98  --    CALCIUM mg/dL 8 2*  --    CALCIUM, IONIZED, ISTAT mmol/L  --  1 24             Results from last 7 days   Lab Units 04/23/23  0443   GLUCOSE RANDOM mg/dL 121             No results found for: BETA-HYDROXYBUTYRATE           Results from last 7 days   Lab Units 04/23/23  0158   PH, JANKI I-STAT  7 357   PCO2, JANKI ISTAT mm HG 47 1   PO2, JANKI ISTAT mm HG 27 0*   HCO3, JANKI ISTAT mmol/L 26 4   I STAT BASE EXC mmol/L 0   I STAT O2 SAT % 47*               ED Treatment:   Medication Administration from 04/23/2023 0139 to 04/23/2023 0417       Date/Time Order Dose Route Action     04/23/2023 0339 EDT desmopressin (DDAVP) 35 2 mcg in sodium chloride 0 9 % 50 mL IVPB 35 2 mcg Intravenous New Bag        Past Medical History:   Diagnosis Date   • Psychiatric disorder     depression     Present on Admission:  **None**      Admitting Diagnosis: Multiple injuries [T07  XXXA]  Scalp laceration, initial encounter [S01 01XA]  Age/Sex: 58 y o  female  Admission Orders:  Medication    methocarbamol (ROBAXIN) tablet 750 mg po q6h scheduled    ondansetron (ZOFRAN) injection 4 mg IV PRN    acetaminophen (TYLENOL) tablet 975 mg po q8h scheduled    DULoxetine (CYMBALTA) delayed release capsule 60 mg po daily    disulfiram (ANTABUSE) tablet 250 mg po daily      OOB to chair TID   [facility]'s       Network Utilization Review Department  ATTENTION: Please call with any questions or concerns to [phone] and carefully listen to the prompts so that you are directed to the right person  All voicemails are confidential   [facility] all requests for admission clinical reviews, approved or denied determinations and any other requests to dedicated fax number below belonging to the campus where the patient is receiving treatment   List of dedicated fax numbers for the Facilities:  [address] DENIALS (Administrative/Medical Necessity) [phone]   [address] (Maternity/NICU/Pediatrics) [phone]   9 Vania Castro [phone]   [facility] 77 [phone]   [address] [address] [address] [place] [place] [address] Vic López 28 [phone]   [address] [facility] [address] [phone]

## 2023-05-01 ENCOUNTER — APPOINTMENT (EMERGENCY)
Dept: CT IMAGING | Facility: HOSPITAL | Age: 63
End: 2023-05-01

## 2023-05-01 ENCOUNTER — HOSPITAL ENCOUNTER (EMERGENCY)
Facility: HOSPITAL | Age: 63
Discharge: HOME | End: 2023-05-01
Attending: EMERGENCY MEDICINE

## 2023-05-01 VITALS
BODY MASS INDEX: 31.66 KG/M2 | WEIGHT: 190.04 LBS | DIASTOLIC BLOOD PRESSURE: 88 MMHG | TEMPERATURE: 97.9 F | HEIGHT: 65 IN | HEART RATE: 67 BPM | OXYGEN SATURATION: 97 % | RESPIRATION RATE: 18 BRPM | SYSTOLIC BLOOD PRESSURE: 155 MMHG

## 2023-05-01 DIAGNOSIS — Z48.02 VISIT FOR SUTURE REMOVAL: ICD-10-CM

## 2023-05-01 DIAGNOSIS — L02.91 ABSCESS: Primary | ICD-10-CM

## 2023-05-01 RX ORDER — CLINDAMYCIN HYDROCHLORIDE 300 MG/1
300 CAPSULE ORAL 4 TIMES DAILY
Qty: 28 CAPSULE | Refills: 0 | Status: SHIPPED | OUTPATIENT
Start: 2023-05-01 | End: 2023-05-08

## 2023-05-01 RX ORDER — GINSENG 100 MG
1 CAPSULE ORAL ONCE
Status: COMPLETED | OUTPATIENT
Start: 2023-05-01 | End: 2023-05-01

## 2023-05-01 RX ADMIN — BACITRACIN 1 SMALL APPLICATION: 500 OINTMENT TOPICAL at 09:43

## 2023-05-01 NOTE — ED PROVIDER NOTES
"History  Chief Complaint   Patient presents with    Wound Check     Pt fell x 1 week ago - hit head; Pt reports terrible head pain at wound site last night and notes \"oozing\" from wound around 0100 today with relief of headache; denies CP/SOB/blurry vision/fever     Julius Alvarez is a 61-year-old female presents with chief complaint of headache at the site of a prior scalp laceration that was repaired with sutures  Patient is scheduled to have her sutures removed tomorrow but reports yesterday she developed a severe headache that got worse as the day went on  Patient reports last night the area where she had sutures started to drain fluid and this morning it drained more significant fluid  Patient reports after the fluid drainage her headache actually improved  No fevers or chills  No confusion  No weakness, language problems or loss of coordination  Review of records reveals patient had 4 Prolene sutures placed  History provided by:  Patient and medical records   used: No    Wound Check   She was treated in the ED 5 to 10 days ago  Previous treatment in the ED includes laceration repair and wound cleansing or irrigation  There has been no treatment since the wound repair  There has been colored discharge from the wound  There is new redness present  There is new swelling present  There is new pain present  Prior to Admission Medications   Prescriptions Last Dose Informant Patient Reported? Taking?    DULoxetine (CYMBALTA) 60 mg delayed release capsule   Yes No   Sig: Take 60 mg by mouth daily   Multiple Vitamins-Minerals (MULTIVITAMIN WITH MINERALS) tablet   Yes No   Sig: Take 1 tablet by mouth daily   acetaminophen (TYLENOL) 325 mg tablet   No No   Sig: Take 3 tablets (975 mg total) by mouth every 8 (eight) hours   disulfiram (ANTABUSE) 250 mg tablet   Yes No   Sig: Take 250 mg by mouth daily      Facility-Administered Medications: None       Past Medical History:   Diagnosis Date "  Psychiatric disorder     depression       Past Surgical History:   Procedure Laterality Date    GASTRIC BYPASS      2004    HYSTERECTOMY         History reviewed  No pertinent family history  I have reviewed and agree with the history as documented  E-Cigarette/Vaping     E-Cigarette/Vaping Substances     Social History     Tobacco Use    Smoking status: Never    Smokeless tobacco: Never   Substance Use Topics    Alcohol use: No    Drug use: No       Review of Systems   Constitutional: Negative for chills and fever  Musculoskeletal: Positive for arthralgias (right ankle)  Negative for gait problem  Skin: Positive for wound (sutured wound to left parietal scalp)  Neurological: Positive for headaches  Negative for seizures, speech difficulty and numbness  Physical Exam  Physical Exam  Vitals and nursing note reviewed  Constitutional:       General: She is in acute distress  Appearance: She is well-developed  HENT:      Head: Normocephalic and atraumatic  Eyes:      Pupils: Pupils are equal, round, and reactive to light  Neck:      Vascular: No JVD  Cardiovascular:      Rate and Rhythm: Normal rate and regular rhythm  Heart sounds: Normal heart sounds  No murmur heard  No friction rub  No gallop  Pulmonary:      Effort: Pulmonary effort is normal  No respiratory distress  Breath sounds: Normal breath sounds  No wheezing or rales  Chest:      Chest wall: No tenderness  Musculoskeletal:         General: No tenderness  Normal range of motion  Cervical back: Normal range of motion  Comments: Right ankle in splint     Skin:     General: Skin is warm and dry  Comments: Sutured wound to left parietal scalp with palpable fluctuance beneath it extending out to about 8 cm  Purulent discharge expressed with pressure  Neurological:      General: No focal deficit present  Mental Status: She is alert and oriented to person, place, and time  Psychiatric:         Behavior: Behavior normal          Thought Content: Thought content normal          Judgment: Judgment normal          Vital Signs  ED Triage Vitals [05/01/23 0828]   Temperature Pulse Respirations Blood Pressure SpO2   97 9 °F (36 6 °C) 67 18 155/88 97 %      Temp Source Heart Rate Source Patient Position - Orthostatic VS BP Location FiO2 (%)   Oral Monitor Sitting Right arm --      Pain Score       No Pain           Vitals:    05/01/23 0828   BP: 155/88   Pulse: 67   Patient Position - Orthostatic VS: Sitting         Visual Acuity  Visual Acuity    Flowsheet Row Most Recent Value   L Pupil Size (mm) 4   R Pupil Size (mm) 4          ED Medications  Medications   bacitracin topical ointment 1 small application (1 small application Topical Given 5/1/23 0943)       Diagnostic Studies  Results Reviewed     None                 CT head without contrast   ED Interpretation by Kathy Burgess MD (05/01 1017)   This film was interpreted independently by me  Soft tissue swelling and subcutaneous gas in the parietal scalp consistent with abscess  No bony involvement  Procedures  Suture removal    Date/Time: 5/1/2023 10:03 AM  Performed by: Kathy Burgess MD  Authorized by: Kathy Burgess MD   Universal Protocol:  Consent: Verbal consent obtained  Written consent not obtained  Risks and benefits: risks, benefits and alternatives were discussed  Consent given by: patient  Patient understanding: patient states understanding of the procedure being performed  Patient identity confirmed: verbally with patient        Patient location:  ED  Location:     Laterality:  Left    Location:  [address] location:  Scalp  Procedure details:      Tools used:  Suture removal kit    Wound appearance:  Purulent, tender and moist    Number of sutures removed:  4  Post-procedure details:     Post-removal:  Antibiotic ointment applied    Patient tolerance of procedure: Tolerated well, no immediate complications             ED Course                                             Medical Decision Making  Background: 58 y o  female presents with s/s consistent with wound abscess    Differential DX includes but is not limited to: simple localized wound abscess vs deep infection    Plan: ct head, suture removal and expression of purulent discharge, likely d/c on abx      Amount and/or Complexity of Data Reviewed  Radiology: ordered  Risk  OTC drugs  Disposition  Final diagnoses:   Abscess - acute left parietal scalp   Visit for suture removal     Time reflects when diagnosis was documented in both MDM as applicable and the Disposition within this note     Time User Action Codes Description Comment    5/1/2023 10:17 AM Marian Cunha Add [Z51 89] Wound check, abscess     5/1/2023 10:17 AM Barbomid Prayer B Remove [Z51 89] Wound check, abscess     5/1/2023 10:17 AM Barbaraann Prayer B Add [L02 91] Abscess     5/1/2023 10:17 AM Marian Cunha Add [Z48 02] Visit for suture removal     5/1/2023 10:17 AM Barbaratori Prayer B Modify [L02 91] Abscess acute left parietal scalp      ED Disposition     ED Disposition   Discharge    Condition   Stable    Date/Time   Mon May 1, 2023  9:53 AM    Comment   Damian Maldonado discharge to home/self care  Follow-up Information     Follow up With Specialties Details Why Alfred Mahmood MD [facility] Medicine Schedule an appointment as soon as possible for a visit in 1 week  [address]  [address] [employer] [address] [place] [address]  38  400 [facility]            Patient's Medications   Discharge Prescriptions    CLINDAMYCIN (CLEOCIN) 300 MG CAPSULE    Take 1 capsule (300 mg total) by mouth 4 (four) times a day for 7 days       Start Date: 5/1/2023  End Date: 5/8/2023       Order Dose: 300 mg       Quantity: 28 capsule    Refills: 0       No discharge procedures on file      PDMP Review     None          ED Provider  Electronically Signed by           Marycarmen Field MD  05/01/23 1013

## 2023-05-01 NOTE — RESULT ENCOUNTER NOTE
Patient was diagnosed with a scalp abscess  patient was treated with clindamycin  CT results were reviewed